# Patient Record
Sex: FEMALE | Race: BLACK OR AFRICAN AMERICAN | NOT HISPANIC OR LATINO | URBAN - METROPOLITAN AREA
[De-identification: names, ages, dates, MRNs, and addresses within clinical notes are randomized per-mention and may not be internally consistent; named-entity substitution may affect disease eponyms.]

---

## 2017-06-12 ENCOUNTER — EMERGENCY (EMERGENCY)
Facility: HOSPITAL | Age: 27
LOS: 1 days | Discharge: HOME | End: 2017-06-12
Attending: EMERGENCY MEDICINE | Admitting: EMERGENCY MEDICINE
Payer: COMMERCIAL

## 2017-06-12 VITALS
SYSTOLIC BLOOD PRESSURE: 118 MMHG | TEMPERATURE: 98 F | RESPIRATION RATE: 17 BRPM | OXYGEN SATURATION: 99 % | HEART RATE: 76 BPM | DIASTOLIC BLOOD PRESSURE: 60 MMHG

## 2017-06-12 PROCEDURE — 99284 EMERGENCY DEPT VISIT MOD MDM: CPT

## 2017-06-12 RX ORDER — IBUPROFEN 200 MG
600 TABLET ORAL ONCE
Qty: 0 | Refills: 0 | Status: COMPLETED | OUTPATIENT
Start: 2017-06-12 | End: 2017-06-12

## 2017-06-12 RX ADMIN — Medication 600 MILLIGRAM(S): at 18:09

## 2017-06-12 NOTE — ED PROVIDER NOTE - UPPER EXTREMITY EXAM, LEFT
Strong equal . Full active and passive ROM of upper extremities. Strong radial pulses b/l. Distal sensation intact.

## 2017-06-12 NOTE — ED PROVIDER NOTE - ATTENDING CONTRIBUTION TO CARE
I performed the initial face to face bedside interview with this patient regarding history of present illness, review of symptoms and past medical, social and family history.  I completed an independent physical examination.  I was the initial provider who evaluated this patient.  The history, review of symptoms and examination was documented by the scribe in my presence and I attest to the accuracy of the documentation.  I have signed out the follow up of any pending tests (i.e. labs, radiological studies) to the PA.  I have discussed the patient’s plan of care and disposition with the PA.  -ELIE Avendano, DO
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
## 2017-06-12 NOTE — ED PROVIDER NOTE - NS ED MD SCRIBE ATTENDING SCRIBE SECTIONS
VITAL SIGNS( Pullset)/HIV/REVIEW OF SYSTEMS/DISPOSITION/HISTORY OF PRESENT ILLNESS/PAST MEDICAL/SURGICAL/SOCIAL HISTORY/PHYSICAL EXAM
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
## 2017-06-12 NOTE — ED PROVIDER NOTE - PROGRESS NOTE DETAILS
MARY Dempsey: Pt signed out to me by paul PATEL. Plan to give pain meds and discharge. Pt ambulating and neurovascular intact. No bony tenderness. Pt stable for discharge and to follow up with pmd.

## 2024-03-21 ENCOUNTER — EMERGENCY (EMERGENCY)
Facility: HOSPITAL | Age: 34
LOS: 1 days | Discharge: HOME | End: 2024-03-21
Attending: EMERGENCY MEDICINE | Admitting: EMERGENCY MEDICINE
Payer: COMMERCIAL

## 2024-03-21 VITALS
SYSTOLIC BLOOD PRESSURE: 127 MMHG | TEMPERATURE: 98 F | OXYGEN SATURATION: 100 % | RESPIRATION RATE: 16 BRPM | HEART RATE: 86 BPM | DIASTOLIC BLOOD PRESSURE: 75 MMHG

## 2024-03-21 VITALS — HEIGHT: 68 IN

## 2024-03-21 LAB
ALBUMIN SERPL ELPH-MCNC: 4.2 G/DL — SIGNIFICANT CHANGE UP (ref 3.3–5)
ALP SERPL-CCNC: 67 U/L — SIGNIFICANT CHANGE UP (ref 40–120)
ALT FLD-CCNC: 21 U/L — SIGNIFICANT CHANGE UP (ref 4–33)
ANION GAP SERPL CALC-SCNC: 10 MMOL/L — SIGNIFICANT CHANGE UP (ref 7–14)
APPEARANCE UR: CLEAR — SIGNIFICANT CHANGE UP
APTT BLD: 28 SEC — SIGNIFICANT CHANGE UP (ref 24.5–35.6)
AST SERPL-CCNC: 24 U/L — SIGNIFICANT CHANGE UP (ref 4–32)
BASOPHILS # BLD AUTO: 0.05 K/UL — SIGNIFICANT CHANGE UP (ref 0–0.2)
BASOPHILS NFR BLD AUTO: 0.5 % — SIGNIFICANT CHANGE UP (ref 0–2)
BILIRUB SERPL-MCNC: 0.3 MG/DL — SIGNIFICANT CHANGE UP (ref 0.2–1.2)
BILIRUB UR-MCNC: NEGATIVE — SIGNIFICANT CHANGE UP
BLD GP AB SCN SERPL QL: NEGATIVE — SIGNIFICANT CHANGE UP
BUN SERPL-MCNC: 12 MG/DL — SIGNIFICANT CHANGE UP (ref 7–23)
CALCIUM SERPL-MCNC: 9.2 MG/DL — SIGNIFICANT CHANGE UP (ref 8.4–10.5)
CHLORIDE SERPL-SCNC: 106 MMOL/L — SIGNIFICANT CHANGE UP (ref 98–107)
CO2 SERPL-SCNC: 23 MMOL/L — SIGNIFICANT CHANGE UP (ref 22–31)
COLOR SPEC: YELLOW — SIGNIFICANT CHANGE UP
CREAT SERPL-MCNC: 0.8 MG/DL — SIGNIFICANT CHANGE UP (ref 0.5–1.3)
DIFF PNL FLD: NEGATIVE — SIGNIFICANT CHANGE UP
EGFR: 100 ML/MIN/1.73M2 — SIGNIFICANT CHANGE UP
EOSINOPHIL # BLD AUTO: 0.11 K/UL — SIGNIFICANT CHANGE UP (ref 0–0.5)
EOSINOPHIL NFR BLD AUTO: 1.1 % — SIGNIFICANT CHANGE UP (ref 0–6)
GLUCOSE SERPL-MCNC: 97 MG/DL — SIGNIFICANT CHANGE UP (ref 70–99)
GLUCOSE UR QL: NEGATIVE MG/DL — SIGNIFICANT CHANGE UP
HCG SERPL-ACNC: 768.2 MIU/ML — SIGNIFICANT CHANGE UP
HCT VFR BLD CALC: 29.2 % — LOW (ref 34.5–45)
HGB BLD-MCNC: 9.9 G/DL — LOW (ref 11.5–15.5)
IANC: 6.75 K/UL — SIGNIFICANT CHANGE UP (ref 1.8–7.4)
IMM GRANULOCYTES NFR BLD AUTO: 0.2 % — SIGNIFICANT CHANGE UP (ref 0–0.9)
INR BLD: 0.95 RATIO — SIGNIFICANT CHANGE UP (ref 0.85–1.18)
KETONES UR-MCNC: ABNORMAL MG/DL
LEUKOCYTE ESTERASE UR-ACNC: NEGATIVE — SIGNIFICANT CHANGE UP
LIDOCAIN IGE QN: 39 U/L — SIGNIFICANT CHANGE UP (ref 7–60)
LYMPHOCYTES # BLD AUTO: 2.78 K/UL — SIGNIFICANT CHANGE UP (ref 1–3.3)
LYMPHOCYTES # BLD AUTO: 26.6 % — SIGNIFICANT CHANGE UP (ref 13–44)
MCHC RBC-ENTMCNC: 27.7 PG — SIGNIFICANT CHANGE UP (ref 27–34)
MCHC RBC-ENTMCNC: 33.9 GM/DL — SIGNIFICANT CHANGE UP (ref 32–36)
MCV RBC AUTO: 81.6 FL — SIGNIFICANT CHANGE UP (ref 80–100)
MONOCYTES # BLD AUTO: 0.76 K/UL — SIGNIFICANT CHANGE UP (ref 0–0.9)
MONOCYTES NFR BLD AUTO: 7.3 % — SIGNIFICANT CHANGE UP (ref 2–14)
NEUTROPHILS # BLD AUTO: 6.75 K/UL — SIGNIFICANT CHANGE UP (ref 1.8–7.4)
NEUTROPHILS NFR BLD AUTO: 64.3 % — SIGNIFICANT CHANGE UP (ref 43–77)
NITRITE UR-MCNC: NEGATIVE — SIGNIFICANT CHANGE UP
NRBC # BLD: 0 /100 WBCS — SIGNIFICANT CHANGE UP (ref 0–0)
NRBC # FLD: 0 K/UL — SIGNIFICANT CHANGE UP (ref 0–0)
PH UR: 5.5 — SIGNIFICANT CHANGE UP (ref 5–8)
PLATELET # BLD AUTO: 230 K/UL — SIGNIFICANT CHANGE UP (ref 150–400)
POTASSIUM SERPL-MCNC: 3.8 MMOL/L — SIGNIFICANT CHANGE UP (ref 3.5–5.3)
POTASSIUM SERPL-SCNC: 3.8 MMOL/L — SIGNIFICANT CHANGE UP (ref 3.5–5.3)
PROT SERPL-MCNC: 7.2 G/DL — SIGNIFICANT CHANGE UP (ref 6–8.3)
PROT UR-MCNC: NEGATIVE MG/DL — SIGNIFICANT CHANGE UP
PROTHROM AB SERPL-ACNC: 10.7 SEC — SIGNIFICANT CHANGE UP (ref 9.5–13)
RBC # BLD: 3.58 M/UL — LOW (ref 3.8–5.2)
RBC # FLD: 13.7 % — SIGNIFICANT CHANGE UP (ref 10.3–14.5)
RH IG SCN BLD-IMP: POSITIVE — SIGNIFICANT CHANGE UP
SODIUM SERPL-SCNC: 139 MMOL/L — SIGNIFICANT CHANGE UP (ref 135–145)
SP GR SPEC: 1.02 — SIGNIFICANT CHANGE UP (ref 1–1.03)
UROBILINOGEN FLD QL: 1 MG/DL — SIGNIFICANT CHANGE UP (ref 0.2–1)
WBC # BLD: 10.47 K/UL — SIGNIFICANT CHANGE UP (ref 3.8–10.5)
WBC # FLD AUTO: 10.47 K/UL — SIGNIFICANT CHANGE UP (ref 3.8–10.5)

## 2024-03-21 PROCEDURE — 93970 EXTREMITY STUDY: CPT | Mod: 26

## 2024-03-21 PROCEDURE — 99285 EMERGENCY DEPT VISIT HI MDM: CPT

## 2024-03-21 PROCEDURE — 99053 MED SERV 10PM-8AM 24 HR FAC: CPT

## 2024-03-21 PROCEDURE — 99284 EMERGENCY DEPT VISIT MOD MDM: CPT | Mod: GC

## 2024-03-21 PROCEDURE — 76817 TRANSVAGINAL US OBSTETRIC: CPT | Mod: 26

## 2024-03-21 RX ORDER — METHOTREXATE 2.5 MG/1
100 TABLET ORAL ONCE
Refills: 0 | Status: COMPLETED | OUTPATIENT
Start: 2024-03-21 | End: 2024-03-21

## 2024-03-21 RX ADMIN — METHOTREXATE 100 MILLIGRAM(S): 2.5 TABLET ORAL at 10:50

## 2024-03-21 NOTE — ED PROVIDER NOTE - CLINICAL SUMMARY MEDICAL DECISION MAKING FREE TEXT BOX
33 yo F no PMHx presents with vaginal bleeding since  +HCG with no visualized IUP at 2w. Patient with a nonfocal exam, bilateral LE appear slightly asymmetric L>R. Will obtain CBC to evaluate Hgb, TVUS to evaluate IUP to work up threatened  vs ectopic.

## 2024-03-21 NOTE — ED PROVIDER NOTE - OBJECTIVE STATEMENT
35 yo  F presents with vaginal bleeding that has been ongoing since . Patient had a positive pregnancy test 1 week ago and had a follow up ultrasound without an IUP BHCG was 100s and then 200s the next day. Her bleeding had gotten worse while she was traveling and then eased in the last few days. She denies symptoms of weakness, lightheadedness. She got off a flight from [place] today and noticed L>R leg swelling, no sob.

## 2024-03-21 NOTE — ED PROVIDER NOTE - PHYSICAL EXAMINATION
GENERAL: no acute distress, non-toxic appearing  HEAD: normocephalic, atraumatic  HEENT: normal conjunctiva, oral mucosa moist, neck supple  CARDIAC: regular rate and rhythm, normal S1 and S2,  no appreciable murmurs  PULM: clear to ascultation bilaterally, no crackles, rales, rhonchi, or wheezing  GI: abdomen nondistended, soft, nontender, no guarding or rebound tenderness  NEURO: alert and oriented x 3, normal speech, moving all extremities   MSK: +slight asymmetry L>R swelling LE at ankle, no visible deformities,  calf tenderness/redness/swelling  SKIN: no visible rashes, dry, well-perfused  PSYCH: appropriate mood and affect

## 2024-03-21 NOTE — CONSULT NOTE ADULT - ASSESSMENT
33yo  @ 3w0d by LMP who presents with vaginal bleeding and for further management of previously described PUL. Pregnancy not desired and TVUS with imaging findings concerning for a left-sided ectopic pregnancy. hCG 768.2 and patient is overall hemodynamically stable w/ reassuring VS. Patient asymtomatic with benign abdomen.  Ectopic is <3.5 cm with no heart beat or contraindication for medical management Patient is candidate for treatment with Methotrexate therapy. Patient opts for methotrexate therapy.      Recommendations   - Dose for MTX is 100mg. D1 is today (3/21). D4 is 3/24 (patient informed she needs to follow-up in the ED or if her GYN can provide her a lab slip)  - Reviewed MTX patient information forms. Given the opportunity to ask questions and have concerns addressed. All questions were answered to the patient's apparent satisfaction   - Reviewed ruptured ectopic return precautions. Patient verbalized understanding    Koffi Daley  PGY-2, Obstetrics & Gynecology     seen and evaluated w/ Dr. DORIAN Parikh

## 2024-03-21 NOTE — ED ADULT TRIAGE NOTE - CHIEF COMPLAINT QUOTE
Patient c/o vaginal bleeding. Endorses intermittent spotting x few days. Sent by pmd to rule out ectopic. Denies abdominal pain. Pt. also c/o b/l leg pain, just got off plane from [place]. No PMH.

## 2024-03-21 NOTE — ED PROVIDER NOTE - PROGRESS NOTE DETAILS
Zvi Dubin, MD note: Called from radiology w/ US concerning for ectopic. HCG ~770. UA neg.   Pt told to be NPO. coags ordered. OBGYJANN paged. OBGYN consulted for L tubal lesion concerning for ectopic    Christel Lieberman MD, PGY3 33 yo F with L tubal lesion, OBGYN has seen patient, currently awaiting recommendations    Christel Lieberman MD, PGY3 Zvi Dubin, MD note: Called from radiology w/ US concerning for ectopic. HCG ~770. UA neg. Type +.   Pt told to be NPO. coags ordered. OBGYN paged. Mackenzie Montes De Oca DO PGY-3  Patient signed out to me pending OBGYN attending evaluation. OBGYN has evaluated patient, recommending methotrexate and follow up within 72 hours (Tre 3/24) for repeat beta check.     Discussed the plan for discharge home with the patient. Advised return precautions for any alarming or worsening symptoms, or any other concerns. Recommended that the patient call their primary care doctor and OBGYN today, inform them of their visit to the ER, and to obtain repeat evaluation in the ER in 3 days. Patient expresses understanding and agrees with the plan for follow up. At the time of discharge the patient remained stable, in no acute distress. Mackenzie Montes De Oca DO PGY-3  Patient signed out to me pending OBGYN attending evaluation. OBGYN has evaluated patient, recommending methotrexate and follow up within 72 hours (Tre 3/24) for repeat beta check.     Discussed the plan for discharge home with the patient. Advised return precautions for any alarming or worsening symptoms, or any other concerns. Recommended that the patient call their primary care doctor and OBGYN today, inform them of their visit to the ER, and to obtain repeat evaluation in the ER in 3 days. Patient expresses understanding and agrees with the plan for follow up. At the time of discharge the patient remained stable, in no acute distress.    Return precautions for ectopic pregnancy were provided prior to discharge.

## 2024-03-21 NOTE — ED PROVIDER NOTE - NSFOLLOWUPINSTRUCTIONS_ED_ALL_ED_FT
You were seen in the ER today for ectopic pregnancy diagnosed with ultrasound and labs. OBGYN evaluated you.  We treated youmedically with methotrexate.      Please return to the ER on Sunday March 24, 2024 in the morning for repeat bloodwork and evaluation.    Please follow up with your primary care doctor and OBGYN  within 1 - 3 days. Call and let them know you were seen in the ER today.     Please return to the Emergency Department if you experience any new or concerning symptoms, such as, but not limited to: worsening or severe pain, large amount of bleeding, passing out, shaking chills, vision changes, chest pain, difficulty breathing, unable to eat or drink, continuous vomiting or diarrhea, or are unable to move or feel part of your body.

## 2024-03-21 NOTE — ED ADULT NURSE NOTE - OBJECTIVE STATEMENT
sent by MD for r/o ectopic pregnancy. pt reports small amount of vaginal bleeding x 3 weeks, less than 1 pad saturated per day. pt denies pain, weakness, or dizziness. pt also returned from [place] trip via [place], landed at approx midnight. pt states b/l leg swelling noted since flight. denies n/t or sob. 20G iv placed to left AC, blood drawn and sent to lab. iv flushing well without complications, iv site WNL. safety measures maintained, side rails up x2. family at bedside. awaiting ultrasound

## 2024-03-21 NOTE — ED ADULT NURSE REASSESSMENT NOTE - NS ED NURSE REASSESS COMMENT FT1
additional blood work drawn and sent to lab. pt awaiting OBGYN consult. family at bedside
Received report from nightshift RN. Pt resting comfortably in bed, pending OBGYN Consult, updated on plan of care, verbalized understanding. Will continue to monitor.

## 2024-03-21 NOTE — ED PROVIDER NOTE - ATTENDING CONTRIBUTION TO CARE
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 +home UCG about a week ago.  Patient recently got off the plane from [place] and complains of left > right calf swelling.  No motor or sensory changes.    VS: afebrile, others  reassuring   Gen: Well appearing in NAD  Head: NC/AT  ENT: moist mucous membranes   Neck: trachea midline, FROM (painless) - neg Sherrilldzinski   Resp:  No distress  Ext: L calf slightly larger than R calf, neurovasc intact, ,gait normal  Neuro:  A&Ox3  Skin:  Warm and dry as visualized  Psych:  appropriate affect and mood    Initial ED MDM: First trimester vaginal bleeding - hemodynamically stable & well appearing (other DDx: most likely threatened , other , implantation bleeding vs less likely ectopic, torsion or molar pregnancy)  Plan: 1) LABS/UA/HCG/T&S.  2) Transvaginal US & b/l LE US for DVT even though, as explained, hypercoaguable risk really doesn't increase in pregnancy until the peripartum period.  3) reassess.  4) OBGYN consult if needed. 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 +home UCG about a week ago.  Patient recently got off the plane from [place] and complains of left > right calf swelling.  No motor or sensory changes.    VS: afebrile, others  reassuring   Gen: Well appearing in NAD  Head: NC/AT  ENT: moist mucous membranes   Neck: trachea midline, FROM (painless) - neg Brudzinski   Resp:  No distress  ABD: nontender  : (chap RN Fanto): no external lesions, no blood in vault, os closed.  Ext: L calf slightly larger than R calf, neurovasc intact, ,gait normal  Neuro:  A&Ox3  Skin:  Warm and dry as visualized  Psych:  appropriate affect and mood    Initial ED MDM: First trimester vaginal bleeding - hemodynamically stable & well appearing (other DDx: most likely threatened , other , implantation bleeding vs less likely ectopic, torsion or molar pregnancy)  Plan: 1) LABS/UA/HCG/T&S.  2) Transvaginal US & b/l LE US for DVT even though, as explained, hypercoaguable risk really doesn't increase in pregnancy until the peripartum period.  3) reassess.  4) OBGYN consult if needed.

## 2024-03-21 NOTE — ED PROVIDER NOTE - PATIENT PORTAL LINK FT
You can access the FollowMyHealth Patient Portal offered by [facility] by registering at the following website: [email or web address]. By joining [facility]’s FollowMyHealth portal, you will also be able to view your health information using other applications (apps) compatible with our system.

## 2024-03-21 NOTE — CONSULT NOTE ADULT - SUBJECTIVE AND OBJECTIVE BOX
CECILIO LOCKWOOD  34y  Female [id number]    HPI: 35yo  @ 3w0d (LMP 2024) who presents to the ED for persistent vaginal bleeding and described further evaluation of known PUL. Patient reports 14 days of vaginal bleeding and spotting, ~1.5 pads/day that has since stopped. Patient initially presented to her GYN on 3/11 where serum hCG was reportedly 168 and repeated the day after (reportedly in 200s). There was nothing visualized in the uterus and at this point, patient left for a vacation in [place] from 3/13-3/20. Patient had been reportedly counseled by her GYN that ectopic was in the differential prior to leaving. States she was able to get a sonogram on 3/16 in [place] at some point that did not show anything in the uterus. This is an unplanned and undesired pregnancy. Patient denies any fevers, chills, chest pain, shortness of breath, n/v, or any other complaints     Name of Ob/Gyn Physician: Dr. 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 Alert. No acute distress   Lungs: Unlabored breathing. No respiratory distress   Abd: Soft, non-tender, and non-distended   (w/ Chaperone RN Yvette Ahumada):  No bleeding on pad. External vulva and labia w/o lesions or skin changes seen.  Bimanual exam with no cervical motion tenderness, uterus mid-position and small, and adnexa non palpable b/l. Adnexa non-tender b/l. Cervix closed. Physiologic discharge in the vault   Ext: No calf tenderness  bilaterally    LABS:                              9.9    10.47 )-----------( 230      ( 21 Mar 2024 04:10 )             29.2     -    139  |  106  |  12  ----------------------------<  97  3.8   |  23  |  0.80    Ca    9.2      21 Mar 2024 04:10    TPro  7.2  /  Alb  4.2  /  TBili  0.3  /  DBili  x   /  AST  24  /  ALT  21  /  AlkPhos  67      I&O's Detail    PT/INR - ( 21 Mar 2024 06:00 )   PT: 10.7 sec;   INR: 0.95 ratio         PTT - ( 21 Mar 2024 06:00 )  PTT:28.0 sec  Urinalysis Basic - ( 21 Mar 2024 04:10 )    Color: Yellow / Appearance: Clear / S.025 / pH: x  Gluc: 97 mg/dL / Ketone: Trace mg/dL  / Bili: Negative / Urobili: 1.0 mg/dL   Blood: x / Protein: Negative mg/dL / Nitrite: Negative   Leuk Esterase: Negative / RBC: x / WBC x   Sq Epi: x / Non Sq Epi: x / Bacteria: x        RADIOLOGY & ADDITIONAL STUDIES:    < from: US Transvaginal, OB (24 @ 05:44) >    ACC: [id number] EXAM:  US OB TRANSVAGINAL   ORDERED BY: ZVI DUBIN     PROCEDURE DATE:  2024          INTERPRETATION:  CLINICAL INFORMATION: First trimester bleeding, beta hCG   760.2.    LMP: 2024    Estimated Gestational Age by LMP: 2024.    COMPARISON: None available.    Endovaginal pelvic sonogram only. Color and Spectral Doppler was   performed.    FINDINGS:  Uterus: Measuring 8.5 x 4.1 x 6.0 cm. No discrete uterine mass.  Endometrium: 5 mm. No intrauterine gestational sac isseen.    Right ovary: 2.8 cm x 2.1 cm x 2.3 cm. corpus luteum. Normal arterial and   venous waveforms.  Left ovary: 3.0 cm x 1.5 cm x 2.7 cm. Within normal limits. Normal   arterial and venous waveforms.    Left adnexa: A complex left extra ovarian lesion measures 1.6 x 0.8 cm.    Fluid: Small right adnexal simple fluid.    IMPRESSION:  A 1.6 cm complex left extraovarian lesion in the absence of a visible IUP   is highly suspicious for a left tubal ectopic pregnancy.    Dr. Santos discussed these findings with Dr. Lynn on 3/21/2024 5:53   AM with read back confirmation.    --- End of Report ---          NISHANT SANTOS MD; Resident Radiologist  This document has been electronically signed.  HOME BENAVIDES MD; Attending Radiologist  This document has been electronically signed. Mar 21 2024  5:56AM    < end of copied text >   CECILIO LOCKWOOD  34y  Female [id number]    HPI: 33yo  @ 3w0d (LMP 2024) who presents to the ED for persistent vaginal bleeding and described further evaluation of known PUL. Patient reports 14 days of vaginal bleeding and spotting, ~1.5 pads/day that has since stopped. Patient initially presented to her GYN on 3/11 where serum hCG was reportedly 168 and repeated the day after (reportedly in 200s). There was nothing visualized in the uterus and at this point, patient left for a vacation in [place] from 3/13-3/20. Patient had been reportedly counseled by her GYN that ectopic was in the differential prior to leaving. States she was able to get a sonogram on 3/16 in [place] at some point that did not show anything in the uterus. This is an unplanned and undesired pregnancy. Patient denies any fevers, chills, chest pain, shortness of breath, n/v, or any other complaints     Name of Ob/Gyn Physician: Dr. 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 Alert. No acute distress   Lungs: Unlabored breathing. No respiratory distress   Abd: Soft, non-tender, and non-distended   (w/ Chaperone RN Yvette Ahumada):  No bleeding on pad. External vulva and labia w/o lesions or skin changes seen.  Bimanual exam with no cervical motion tenderness, uterus mid-position and small, and adnexa non palpable b/l. Adnexa non-tender b/l. Cervix closed. Physiologic discharge in the vault   Ext: No calf tenderness  bilaterally    LABS:                              9.9    10.47 )-----------( 230      ( 21 Mar 2024 04:10 )             29.2         139  |  106  |  12  ----------------------------<  97  3.8   |  23  |  0.80    Ca    9.2      21 Mar 2024 04:10    TPro  7.2  /  Alb  4.2  /  TBili  0.3  /  DBili  x   /  AST  24  /  ALT  21  /  AlkPhos  67      I&O's Detail    PT/INR - ( 21 Mar 2024 06:00 )   PT: 10.7 sec;   INR: 0.95 ratio         PTT - ( 21 Mar 2024 06:00 )  PTT:28.0 sec  Urinalysis Basic - ( 21 Mar 2024 04:10 )    Color: Yellow / Appearance: Clear / S.025 / pH: x  Gluc: 97 mg/dL / Ketone: Trace mg/dL  / Bili: Negative / Urobili: 1.0 mg/dL   Blood: x / Protein: Negative mg/dL / Nitrite: Negative   Leuk Esterase: Negative / RBC: x / WBC x   Sq Epi: x / Non Sq Epi: x / Bacteria: x    HCG Quantitative, Serum: 768.2      RADIOLOGY & ADDITIONAL STUDIES:    < from: US Transvaginal, OB (24 @ 05:44) >    ACC: [id number] EXAM:  US OB TRANSVAGINAL   ORDERED BY: ZVI DUBIN     PROCEDURE DATE:  2024          INTERPRETATION:  CLINICAL INFORMATION: First trimester bleeding, beta hCG   760.2.    LMP: 2024    Estimated Gestational Age by LMP: 2024.    COMPARISON: None available.    Endovaginal pelvic sonogram only. Color and Spectral Doppler was   performed.    FINDINGS:  Uterus: Measuring 8.5 x 4.1 x 6.0 cm. No discrete uterine mass.  Endometrium: 5 mm. No intrauterine gestational sac isseen.    Right ovary: 2.8 cm x 2.1 cm x 2.3 cm. corpus luteum. Normal arterial and   venous waveforms.  Left ovary: 3.0 cm x 1.5 cm x 2.7 cm. Within normal limits. Normal   arterial and venous waveforms.    Left adnexa: A complex left extra ovarian lesion measures 1.6 x 0.8 cm.    Fluid: Small right adnexal simple fluid.    IMPRESSION:  A 1.6 cm complex left extraovarian lesion in the absence of a visible IUP   is highly suspicious for a left tubal ectopic pregnancy.    Dr. Santos discussed these findings with Dr. Lynn on 3/21/2024 5:53   AM with read back confirmation.    --- End of Report ---          NISHANT SANTOS MD; Resident Radiologist  This document has been electronically signed.  HOME BENAVIDES MD; Attending Radiologist  This document has been electronically signed. Mar 21 2024  5:56AM    < end of copied text >

## 2024-03-24 ENCOUNTER — EMERGENCY (EMERGENCY)
Facility: HOSPITAL | Age: 34
LOS: 1 days | Discharge: HOME | End: 2024-03-24
Attending: EMERGENCY MEDICINE
Payer: COMMERCIAL

## 2024-03-24 VITALS
HEIGHT: 68 IN | DIASTOLIC BLOOD PRESSURE: 53 MMHG | HEART RATE: 71 BPM | RESPIRATION RATE: 20 BRPM | WEIGHT: 181 LBS | OXYGEN SATURATION: 100 % | TEMPERATURE: 98 F | SYSTOLIC BLOOD PRESSURE: 100 MMHG

## 2024-03-24 VITALS
DIASTOLIC BLOOD PRESSURE: 71 MMHG | SYSTOLIC BLOOD PRESSURE: 119 MMHG | RESPIRATION RATE: 16 BRPM | OXYGEN SATURATION: 98 % | HEART RATE: 72 BPM | TEMPERATURE: 98 F

## 2024-03-24 LAB
ALBUMIN SERPL ELPH-MCNC: 4.4 G/DL — SIGNIFICANT CHANGE UP (ref 3.3–5)
ALP SERPL-CCNC: 70 U/L — SIGNIFICANT CHANGE UP (ref 40–120)
ALT FLD-CCNC: 46 U/L — HIGH (ref 10–45)
ANION GAP SERPL CALC-SCNC: 13 MMOL/L — SIGNIFICANT CHANGE UP (ref 5–17)
AST SERPL-CCNC: 34 U/L — SIGNIFICANT CHANGE UP (ref 10–40)
BASOPHILS # BLD AUTO: 0.03 K/UL — SIGNIFICANT CHANGE UP (ref 0–0.2)
BASOPHILS NFR BLD AUTO: 0.5 % — SIGNIFICANT CHANGE UP (ref 0–2)
BILIRUB SERPL-MCNC: 0.5 MG/DL — SIGNIFICANT CHANGE UP (ref 0.2–1.2)
BLD GP AB SCN SERPL QL: NEGATIVE — SIGNIFICANT CHANGE UP
BUN SERPL-MCNC: 9 MG/DL — SIGNIFICANT CHANGE UP (ref 7–23)
CALCIUM SERPL-MCNC: 9.5 MG/DL — SIGNIFICANT CHANGE UP (ref 8.4–10.5)
CHLORIDE SERPL-SCNC: 105 MMOL/L — SIGNIFICANT CHANGE UP (ref 96–108)
CO2 SERPL-SCNC: 22 MMOL/L — SIGNIFICANT CHANGE UP (ref 22–31)
CREAT SERPL-MCNC: 0.66 MG/DL — SIGNIFICANT CHANGE UP (ref 0.5–1.3)
EGFR: 119 ML/MIN/1.73M2 — SIGNIFICANT CHANGE UP
EOSINOPHIL # BLD AUTO: 0.07 K/UL — SIGNIFICANT CHANGE UP (ref 0–0.5)
EOSINOPHIL NFR BLD AUTO: 1.3 % — SIGNIFICANT CHANGE UP (ref 0–6)
GLUCOSE SERPL-MCNC: 94 MG/DL — SIGNIFICANT CHANGE UP (ref 70–99)
HCG SERPL-ACNC: 904.1 MIU/ML — HIGH
HCT VFR BLD CALC: 33 % — LOW (ref 34.5–45)
HGB BLD-MCNC: 10.9 G/DL — LOW (ref 11.5–15.5)
IMM GRANULOCYTES NFR BLD AUTO: 0.4 % — SIGNIFICANT CHANGE UP (ref 0–0.9)
LYMPHOCYTES # BLD AUTO: 1.72 K/UL — SIGNIFICANT CHANGE UP (ref 1–3.3)
LYMPHOCYTES # BLD AUTO: 31.2 % — SIGNIFICANT CHANGE UP (ref 13–44)
MCHC RBC-ENTMCNC: 27.3 PG — SIGNIFICANT CHANGE UP (ref 27–34)
MCHC RBC-ENTMCNC: 33 GM/DL — SIGNIFICANT CHANGE UP (ref 32–36)
MCV RBC AUTO: 82.7 FL — SIGNIFICANT CHANGE UP (ref 80–100)
MONOCYTES # BLD AUTO: 0.21 K/UL — SIGNIFICANT CHANGE UP (ref 0–0.9)
MONOCYTES NFR BLD AUTO: 3.8 % — SIGNIFICANT CHANGE UP (ref 2–14)
NEUTROPHILS # BLD AUTO: 3.46 K/UL — SIGNIFICANT CHANGE UP (ref 1.8–7.4)
NEUTROPHILS NFR BLD AUTO: 62.8 % — SIGNIFICANT CHANGE UP (ref 43–77)
NRBC # BLD: 0 /100 WBCS — SIGNIFICANT CHANGE UP (ref 0–0)
PLATELET # BLD AUTO: 249 K/UL — SIGNIFICANT CHANGE UP (ref 150–400)
POTASSIUM SERPL-MCNC: 3.9 MMOL/L — SIGNIFICANT CHANGE UP (ref 3.5–5.3)
POTASSIUM SERPL-SCNC: 3.9 MMOL/L — SIGNIFICANT CHANGE UP (ref 3.5–5.3)
PROT SERPL-MCNC: 7.4 G/DL — SIGNIFICANT CHANGE UP (ref 6–8.3)
RBC # BLD: 3.99 M/UL — SIGNIFICANT CHANGE UP (ref 3.8–5.2)
RBC # FLD: 13.7 % — SIGNIFICANT CHANGE UP (ref 10.3–14.5)
RH IG SCN BLD-IMP: POSITIVE — SIGNIFICANT CHANGE UP
SODIUM SERPL-SCNC: 140 MMOL/L — SIGNIFICANT CHANGE UP (ref 135–145)
WBC # BLD: 5.51 K/UL — SIGNIFICANT CHANGE UP (ref 3.8–10.5)
WBC # FLD AUTO: 5.51 K/UL — SIGNIFICANT CHANGE UP (ref 3.8–10.5)

## 2024-03-24 PROCEDURE — 85025 COMPLETE CBC W/AUTO DIFF WBC: CPT

## 2024-03-24 PROCEDURE — 76817 TRANSVAGINAL US OBSTETRIC: CPT | Mod: 26

## 2024-03-24 PROCEDURE — 80053 COMPREHEN METABOLIC PANEL: CPT

## 2024-03-24 PROCEDURE — 86901 BLOOD TYPING SEROLOGIC RH(D): CPT

## 2024-03-24 PROCEDURE — 84702 CHORIONIC GONADOTROPIN TEST: CPT

## 2024-03-24 PROCEDURE — 76817 TRANSVAGINAL US OBSTETRIC: CPT

## 2024-03-24 PROCEDURE — 99284 EMERGENCY DEPT VISIT MOD MDM: CPT

## 2024-03-24 PROCEDURE — 99284 EMERGENCY DEPT VISIT MOD MDM: CPT | Mod: 25

## 2024-03-24 PROCEDURE — 86900 BLOOD TYPING SEROLOGIC ABO: CPT

## 2024-03-24 PROCEDURE — 36415 COLL VENOUS BLD VENIPUNCTURE: CPT

## 2024-03-24 PROCEDURE — 86850 RBC ANTIBODY SCREEN: CPT

## 2024-03-24 NOTE — CONSULT NOTE ADULT - ASSESSMENT
A/P   34y    presenting to ED for day 4 beta-hCG follow-up after receiving methotrexate on 3/21. Reports continued vaginal bleeding, but no heavy bleeding. Denies significant abdominal pain. Beta-hCG today is 904.1. TVUS shows stable left ectopic pregnancy. Vitals stable.       #Left ectopic pregnancy  - Labs reviewed: H/H 10.9/33, WBC 5.51, T&S O+  - Vital signs stable  - Patient to follow up on 3/27 for Day 7 beta-hCG testing  - Ectopic precautions reviewed with patient. Discussed with patient importance of follow up for b-HCG on day 7 (3/27) for appropriate methotrexate management.  Patient expressed understanding.  All questions and concerns addressed to patient's apparent satisfaction.   - strict return precautions provided  - Patient to be added to GYN b-HCG list for closer follow up.    - Stable for d/c home from Gyn perspective, no acute intervention; primary management per ED team      D/w Dr. Matthew Hansen, PGY2

## 2024-03-24 NOTE — ED ADULT NURSE NOTE - IN THE PAST 12 MONTHS HAVE YOU USED DRUGS OTHER THAN THOSE REQUIRED FOR MEDICAL REASON?
Zeeshan Samuel RD  Outcome: Progressing No

## 2024-03-24 NOTE — ED ADULT NURSE NOTE - OBJECTIVE STATEMENT
33 y/o female, A&O x4,  @ 3w0d (LMP 2024) presents to the ED requesting HCG repeat. Pt endorses receiving MTX at [facility] 3/21 for left ectopic pregnancy. Pt states soaking 3-4 days daily, no clots or tissue. Pt affect is calm and appropriate, spontaneous unlabored breathing, abdomen soft nondistended, strong peripheral pulses. Pt denies chest pain, SOB/difficulty breathing, fever/chills, HA, abd pain, N/V/D, lightheadedness/dizziness, numbness/tingling. Safety and comfort measures maintained.

## 2024-03-24 NOTE — ED PROVIDER NOTE - PROGRESS NOTE DETAILS
Wan PGY3: Prior TVUS from 3/21 at [facility]  FINDINGS:  Uterus: Measuring 8.5 x 4.1 x 6.0 cm. No discrete uterine mass.  Endometrium: 5 mm. No intrauterine gestational sac is seen.  Right ovary: 2.8 cm x 2.1 cm x 2.3 cm. corpus luteum. Normal arterial and   venous waveforms.  Left ovary: 3.0 cm x 1.5 cm x 2.7 cm. Within normal limits. Normal   arterial and venous waveforms.  Left adnexa: A complex left extra ovarian lesion measures 1.6 x 0.8 cm.  Fluid: Small right adnexal simple fluid.    IMPRESSION:  A 1.6 cm complex left extraovarian lesion in the absence of a visible IUP   is highly suspicious for a left tubal ectopic pregnancy Received signout. Patient evaluated by OB, can f/u for day 7 hcg check. to be discharged.  -Jett Rosas PGY-2

## 2024-03-24 NOTE — ED ADULT NURSE NOTE - CHIEF COMPLAINT QUOTE
sent for repeat HCG. diagnosed with ectopic pregnancy on 3/21 and given methotrexate. +vaginal bleeding.

## 2024-03-24 NOTE — ED PROVIDER NOTE - CLINICAL SUMMARY MEDICAL DECISION MAKING FREE TEXT BOX
Wan PGY3: 33yo  @ 3w0d (LMP 2024) who presents to the ED for BHCG/repeat TVUS test after being givne MTX at [facility] 3/21 for follow up of L ectopic for repeat bhcg and tvus after getting US on 3/21 and MTX at [facility] (MRN [id number]). Otherwise well appearing w/ some vaginal bleeding but no pelvic pain, syncope or abd sxs. Wan PGY3: 33yo  @ 3w0d (LMP 2024) who presents to the ED for BHCG/repeat TVUS test after being givne MTX at [facility] 3/21 for follow up of L ectopic for repeat bhcg and tvus after getting US on 3/21 and MTX at [facility] (MRN [id number]). Otherwise well appearing w/ some vaginal bleeding but no pelvic pain, syncope or abd sxs.  RGUJRAL 33yo  sp TVUS with imaging findings concerning for a left-sided ectopic pregnancy with hCG 768.2 on 3/21 s/p methotrexate presents for follow up hcg. Pt reports mild vaginal bleeding and mild L pelvic pain. No f/c. No n/v, lightheadedness. Pt is blood type O positive.   On exam, Patient is awake,alert,oriented x 3. Patient is well appearing and in no acute distress. Patient's chest is clear to ausculation, +s1s2. Abdomen is soft nd/nt +BS. Extremity with no swelling or calf tenderness.   Obtain labs, US to eval, pt declines any meds. Monitor and re eval.

## 2024-03-24 NOTE — ED PROVIDER NOTE - OBJECTIVE STATEMENT
35yo  @ 3w0d (LMP 2024) who presents to the ED for BHCG/repeat TVUS test after being givne MTX at [facility] 3/21 for suspected L ectopic. Last wnl menstrual period , then had what seemed to be her period  but continued to have spotting and bleeding so went to GYN on 3/11 who informed her she was preg. At ED visit 3/21 - BHCG 768, O+, Hbg 9.9, TVUS (see below progress note). No significant abd pain but having inc bleeding since given MTX , 3-4 normal thickness pads/day. no lightheaded/sob, +fatigue. No blood thinners.     Name of Ob/Gyn Physician: Dr. Suki Davies

## 2024-03-24 NOTE — ED PROVIDER NOTE - PATIENT PORTAL LINK FT
You can access the FollowMyHealth Patient Portal offered by [facility] by registering at the following website: [email or web address]. By joining [employer]’s FollowMyHealth portal, you will also be able to view your health information using other applications (apps) compatible with our system.

## 2024-03-24 NOTE — ED ADULT NURSE NOTE - NSFALLUNIVINTERV_ED_ALL_ED
Bed/Stretcher in lowest position, wheels locked, appropriate side rails in place/Call bell, personal items and telephone in reach/Instruct patient to call for assistance before getting out of bed/chair/stretcher/Non-slip footwear applied when patient is off stretcher/[place] to call system/Physically safe environment - no spills, clutter or unnecessary equipment/Purposeful proactive rounding/Room/bathroom lighting operational, light cord in reach

## 2024-03-24 NOTE — ED ADULT TRIAGE NOTE - CHIEF COMPLAINT QUOTE
sent for repeat HCG. diagnosed with ectopic pregnancy on 3/21. +vaginal bleeding. sent for repeat HCG. diagnosed with ectopic pregnancy on 3/21 and given methotrexate. +vaginal bleeding.

## 2024-03-24 NOTE — ED PROVIDER NOTE - NSFOLLOWUPINSTRUCTIONS_ED_ALL_ED_FT
Your lab and imaging results from today's visit are attached.    Please follow up on 3/27 for day 7 hcg check.    Return to the ED for new or worsening symptoms.

## 2024-03-24 NOTE — CHART NOTE - NSCHARTNOTEFT_GEN_A_CORE
Called patient to remind her to come to the ED to get day 4 MTX HCG drawn. Patient did not . Voicemail left with reminder to come in today.    Tiffanie Young, PGY2

## 2024-03-24 NOTE — CONSULT NOTE ADULT - SUBJECTIVE AND OBJECTIVE BOX
GYN Consult Note    34y  @ 3w3d by LMP 24 who presents to the ED for methotrexate f/u. Patient was seen at [facility] on 3/21 for persistent vaginal bleeding and for PUL workup. She had beta-hCG testing with private OBGYN.   TVUS on 3/21 showed a 1.6 cm complex left extraovarian lesion highly suspicious for a left tubal ectopic pregnancy. Was administered methotrexate on 3/21, with beta-hCG 768.2 at that time.     Today, she reports continued mild-moderate bleeding. Denies saturating pads in <1 hour, severe abdominal pain, vomiting, or any lightheadedness.       Name of Ob/Gyn Physician: Dr. 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  - Primarily staying in [place] right now but primary residence is [place]           Vital Signs Last 24 Hrs  T(C): 36.7 (24 Mar 2024 18:42), Max: 36.7 (24 Mar 2024 18:19)  T(F): 98 (24 Mar 2024 18:42), Max: 98 (24 Mar 2024 18:19)  HR: 85 (24 Mar 2024 18:42) (71 - 85)  BP: 105/63 (24 Mar 2024 18:42) (100/53 - 105/63)  BP(mean): 77 (24 Mar 2024 18:42) (77 - 77)  RR: 16 (24 Mar 2024 18:42) (16 - 20)  SpO2: 100% (24 Mar 2024 18:42) (100% - 100%)    Parameters below as of 24 Mar 2024 18:42  Patient On (Oxygen Delivery Method): room air        PHYSICAL EXAM:  Gen: NAD, alert and oriented x 3  Cardiovascular: regular   Respiratory: breathing comfortably on RA  Abd: soft, non tender, non-distended, no rebound tenderness  Extremities: NTBL  Skin: warm and well perfused        LABS:                        10.9   5.51  )-----------( 249      ( 24 Mar 2024 18:57 )             33.0     03-24    140  |  105  |  9   ----------------------------<  94  3.9   |  22  |  0.66    Ca    9.5      24 Mar 2024 18:57    TPro  7.4  /  Alb  4.4  /  TBili  0.5  /  DBili  x   /  AST  34  /  ALT  46<H>  /  AlkPhos  70        Urinalysis Basic - ( 24 Mar 2024 18:57 )    Color: x / Appearance: x / SG: x / pH: x  Gluc: 94 mg/dL / Ketone: x  / Bili: x / Urobili: x   Blood: x / Protein: x / Nitrite: x   Leuk Esterase: x / RBC: x / WBC x   Sq Epi: x / Non Sq Epi: x / Bacteria: x        RADIOLOGY & ADDITIONAL STUDIES:  < from: US Transvaginal, OB (24 @ 20:44) >  FINDINGS:  Uterus: 7.3 x 3.3 x 5.9 cm. No intrauterine pregnancy visualized.   Endometrium measures 2 mm. Trace fluid in the endometrial cavity.    Right ovary/adnexa: 3.8 cm x 1.5 cm x 2.8 cm. Corpus luteum.  Left ovary/adnexa: 3.1 cm x 1.8 cm x 1.5 cm. Round soft tissue   echogenicity extraovarian mass measuring 1.6 x 1.1 x 1.1 cm with central   anechoic region, similar in appearance as compared with 3/21/2024. No   evidence of fetal pole within this area.    Fluid: Trace fluid in the left adnexa.    IMPRESSION:  Stable appearance to left adnexal ectopic. No significant complex fluid.      --- End of Report ---    < end of copied text >

## 2024-03-24 NOTE — ED PROVIDER NOTE - PHYSICAL EXAMINATION
CONSTITUTIONAL: NAD  SKIN: Warm dry  HEAD: NCAT  EYES: NL inspection; no conjunctival pallor   CARD: RRR  RESP: CTAB  ABD: S/NT no R/G; non-tender, no rebound/guarding  EXT: no LE edema  NEURO: Grossly unremarkable  PSYCH: Cooperative, appropriate.

## 2024-03-26 NOTE — CHART NOTE - NSCHARTNOTEFT_GEN_A_CORE
Called and left message to remind patient to have day 7 bHCG drawn tomorrow 3/27 in ED.     MARY Bess

## 2024-03-27 ENCOUNTER — EMERGENCY (EMERGENCY)
Facility: HOSPITAL | Age: 34
LOS: 1 days | Discharge: HOME | End: 2024-03-27
Attending: STUDENT IN AN ORGANIZED HEALTH CARE EDUCATION/TRAINING PROGRAM
Payer: COMMERCIAL

## 2024-03-27 VITALS
OXYGEN SATURATION: 100 % | DIASTOLIC BLOOD PRESSURE: 67 MMHG | RESPIRATION RATE: 17 BRPM | SYSTOLIC BLOOD PRESSURE: 100 MMHG | TEMPERATURE: 99 F | HEART RATE: 65 BPM

## 2024-03-27 VITALS
HEART RATE: 74 BPM | SYSTOLIC BLOOD PRESSURE: 95 MMHG | WEIGHT: 179.9 LBS | TEMPERATURE: 98 F | RESPIRATION RATE: 16 BRPM | HEIGHT: 68 IN | DIASTOLIC BLOOD PRESSURE: 55 MMHG | OXYGEN SATURATION: 98 %

## 2024-03-27 PROBLEM — Z87.59 PERSONAL HISTORY OF OTHER COMPLICATIONS OF PREGNANCY, CHILDBIRTH AND THE PUERPERIUM: Chronic | Status: ACTIVE | Noted: 2024-03-24

## 2024-03-27 LAB
ALBUMIN SERPL ELPH-MCNC: 4 G/DL — SIGNIFICANT CHANGE UP (ref 3.3–5)
ALP SERPL-CCNC: 72 U/L — SIGNIFICANT CHANGE UP (ref 40–120)
ALT FLD-CCNC: 27 U/L — SIGNIFICANT CHANGE UP (ref 10–45)
ANION GAP SERPL CALC-SCNC: 10 MMOL/L — SIGNIFICANT CHANGE UP (ref 5–17)
AST SERPL-CCNC: 17 U/L — SIGNIFICANT CHANGE UP (ref 10–40)
BASOPHILS # BLD AUTO: 0.03 K/UL — SIGNIFICANT CHANGE UP (ref 0–0.2)
BASOPHILS NFR BLD AUTO: 0.6 % — SIGNIFICANT CHANGE UP (ref 0–2)
BILIRUB SERPL-MCNC: 0.2 MG/DL — SIGNIFICANT CHANGE UP (ref 0.2–1.2)
BUN SERPL-MCNC: 11 MG/DL — SIGNIFICANT CHANGE UP (ref 7–23)
CALCIUM SERPL-MCNC: 9 MG/DL — SIGNIFICANT CHANGE UP (ref 8.4–10.5)
CHLORIDE SERPL-SCNC: 106 MMOL/L — SIGNIFICANT CHANGE UP (ref 96–108)
CO2 SERPL-SCNC: 24 MMOL/L — SIGNIFICANT CHANGE UP (ref 22–31)
CREAT SERPL-MCNC: 0.68 MG/DL — SIGNIFICANT CHANGE UP (ref 0.5–1.3)
EGFR: 119 ML/MIN/1.73M2 — SIGNIFICANT CHANGE UP
EOSINOPHIL # BLD AUTO: 0.1 K/UL — SIGNIFICANT CHANGE UP (ref 0–0.5)
EOSINOPHIL NFR BLD AUTO: 1.9 % — SIGNIFICANT CHANGE UP (ref 0–6)
GLUCOSE SERPL-MCNC: 86 MG/DL — SIGNIFICANT CHANGE UP (ref 70–99)
HCG SERPL-ACNC: 222.5 MIU/ML — HIGH
HCT VFR BLD CALC: 29.7 % — LOW (ref 34.5–45)
HGB BLD-MCNC: 9.8 G/DL — LOW (ref 11.5–15.5)
IMM GRANULOCYTES NFR BLD AUTO: 0.2 % — SIGNIFICANT CHANGE UP (ref 0–0.9)
LYMPHOCYTES # BLD AUTO: 2.52 K/UL — SIGNIFICANT CHANGE UP (ref 1–3.3)
LYMPHOCYTES # BLD AUTO: 48.8 % — HIGH (ref 13–44)
MCHC RBC-ENTMCNC: 27.5 PG — SIGNIFICANT CHANGE UP (ref 27–34)
MCHC RBC-ENTMCNC: 33 GM/DL — SIGNIFICANT CHANGE UP (ref 32–36)
MCV RBC AUTO: 83.2 FL — SIGNIFICANT CHANGE UP (ref 80–100)
MONOCYTES # BLD AUTO: 0.38 K/UL — SIGNIFICANT CHANGE UP (ref 0–0.9)
MONOCYTES NFR BLD AUTO: 7.4 % — SIGNIFICANT CHANGE UP (ref 2–14)
NEUTROPHILS # BLD AUTO: 2.12 K/UL — SIGNIFICANT CHANGE UP (ref 1.8–7.4)
NEUTROPHILS NFR BLD AUTO: 41.1 % — LOW (ref 43–77)
NRBC # BLD: 0 /100 WBCS — SIGNIFICANT CHANGE UP (ref 0–0)
PLATELET # BLD AUTO: 234 K/UL — SIGNIFICANT CHANGE UP (ref 150–400)
POTASSIUM SERPL-MCNC: 4.2 MMOL/L — SIGNIFICANT CHANGE UP (ref 3.5–5.3)
POTASSIUM SERPL-SCNC: 4.2 MMOL/L — SIGNIFICANT CHANGE UP (ref 3.5–5.3)
PROT SERPL-MCNC: 6.8 G/DL — SIGNIFICANT CHANGE UP (ref 6–8.3)
RBC # BLD: 3.57 M/UL — LOW (ref 3.8–5.2)
RBC # FLD: 13.6 % — SIGNIFICANT CHANGE UP (ref 10.3–14.5)
SODIUM SERPL-SCNC: 140 MMOL/L — SIGNIFICANT CHANGE UP (ref 135–145)
WBC # BLD: 5.16 K/UL — SIGNIFICANT CHANGE UP (ref 3.8–10.5)
WBC # FLD AUTO: 5.16 K/UL — SIGNIFICANT CHANGE UP (ref 3.8–10.5)

## 2024-03-27 PROCEDURE — 99283 EMERGENCY DEPT VISIT LOW MDM: CPT

## 2024-03-27 PROCEDURE — 84702 CHORIONIC GONADOTROPIN TEST: CPT

## 2024-03-27 PROCEDURE — 36415 COLL VENOUS BLD VENIPUNCTURE: CPT

## 2024-03-27 PROCEDURE — 99285 EMERGENCY DEPT VISIT HI MDM: CPT | Mod: 25

## 2024-03-27 PROCEDURE — 93975 VASCULAR STUDY: CPT

## 2024-03-27 PROCEDURE — 80053 COMPREHEN METABOLIC PANEL: CPT

## 2024-03-27 PROCEDURE — 76830 TRANSVAGINAL US NON-OB: CPT

## 2024-03-27 PROCEDURE — 85025 COMPLETE CBC W/AUTO DIFF WBC: CPT

## 2024-03-27 PROCEDURE — 93975 VASCULAR STUDY: CPT | Mod: 26

## 2024-03-27 PROCEDURE — 76830 TRANSVAGINAL US NON-OB: CPT | Mod: 26

## 2024-03-27 PROCEDURE — 99285 EMERGENCY DEPT VISIT HI MDM: CPT

## 2024-03-27 NOTE — ED PROVIDER NOTE - CLINICAL SUMMARY MEDICAL DECISION MAKING FREE TEXT BOX
34y    presenting to ED for day 7 beta-hCG follow-up after receiving methotrexate on 3/21.  Patient indicates that the vaginal bleeding has slowed to nearly a spotting level with no large clots passing.  She does report that she feels worsening left-sided abdominal pain over the last few days.  She indicates that it is worse than when she was seen on 3/24.  Pain waxes and wanes responds to Tylenol.  She last took 1000 mg of Tylenol 3 hours ago.  Patient denies any chest pain, shortness of breath or syncope.  will get labs, cbc/cmp/bhcg and consult obgyn.

## 2024-03-27 NOTE — ED PROVIDER NOTE - PATIENT PORTAL LINK FT
You can access the FollowMyHealth Patient Portal offered by [facility] by registering at the following website: [email or web address]. By joining [employer]’s FollowMyHealth portal, you will also be able to view your health information using other applications (apps) compatible with our system.

## 2024-03-27 NOTE — ED PROVIDER NOTE - OBJECTIVE STATEMENT
34y    presenting to ED for day 7 beta-hCG follow-up after receiving methotrexate on 3/21. 34y    presenting to ED for day 7 beta-hCG follow-up after receiving methotrexate on 3/21.  Patient indicates that the vaginal bleeding has slowed to nearly a spotting level with no large clots passing.  She does report that she feels worsening left-sided abdominal pain over the last few days.  She indicates that it is worse than when she was seen on 3/24.  Pain waxes and wanes responds to Tylenol.  She last took 1000 mg of Tylenol 3 hours ago.  Patient denies any chest pain, shortness of breath or syncope.

## 2024-03-27 NOTE — CONSULT NOTE ADULT - SUBJECTIVE AND OBJECTIVE BOX
CECILIO LOCKWOOD  34y  Female [id number]    HPI:  34y  @ 3w3d by LMP 24 who presents to the ED for methotrexate f/u. Patient was seen at [facility] on 3/21 for persistent vaginal bleeding and for PUL workup. She had beta-hCG testing with private OBGYN.   TVUS on 3/21 showed a 1.6 cm complex left extraovarian lesion highly suspicious for a left tubal ectopic pregnancy. Was administered methotrexate on 3/21, with beta-hCG 768.2->904(3/24)-Day4->225(3/27)Day7 at that time.     Today, she reports continued mild-moderate bleeding. Denies saturating pads in <1 hour, severe abdominal pain, vomiting, or any lightheadedness.       Name of Ob/Gyn Physician: Dr. 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  - Primarily staying in [place] right now but primary residence is [place]     Vital Signs Last 24 Hrs  T(C): 36.8 (27 Mar 2024 17:05), Max: 36.8 (27 Mar 2024 17:05)  T(F): 98.2 (27 Mar 2024 17:05), Max: 98.2 (27 Mar 2024 17:05)  HR: 64 (27 Mar 2024 17:05) (64 - 74)  BP: 101/63 (27 Mar 2024 17:05) (95/55 - 101/63)  BP(mean): --  RR: 18 (27 Mar 2024 17:05) (16 - 18)  SpO2: 100% (27 Mar 2024 17:05) (98% - 100%)    Parameters below as of 27 Mar 2024 17:05  Patient On (Oxygen Delivery Method): room air        Physical Exam:   General: sitting comfortably in bed, NAD   HEENT: neck supple, full ROM  CV: RRR  Lungs: CTA b/l, good air flow b/l   Back: No CVA tenderness  Abd: Soft, non-tender, non-distended.  Bowel sounds present.    :  No bleeding on pad.    External labia wnl.  Bimanual exam with no cervical motion tenderness, uterus wnl, adnexa non palpable b/l.  Cervix closed vs. Cervix dilated  Speculum Exam: No active bleeding from os.  Physiologic discharge.    Ext: non-tender b/l, no edema     LABS:                              9.8    5.16  )-----------( 234      ( 27 Mar 2024 15:44 )             29.7         140  |  106  |  11  ----------------------------<  86  4.2   |  24  |  0.68    Ca    9.0      27 Mar 2024 15:44    TPro  6.8  /  Alb  4.0  /  TBili  0.2  /  DBili  x   /  AST  17  /  ALT    /  AlkPhos  72      I&O's Detail

## 2024-03-27 NOTE — CONSULT NOTE ADULT - ATTENDING COMMENTS
OB attg note    Agree with above. 33yo P0 at 3+3 with presumed ectopic s/p MTX now here for day 7 labs, noted to have appropriate downtrend. Sono with slight increase in size of ectopic however pt asymptomatic with downtrending HCG so OK to follow weekly for HCGs. Rh pos.    Janelle PATEL

## 2024-03-27 NOTE — ED ADULT NURSE NOTE - CHIEF COMPLAINT QUOTE
had an ectopic pregnancy and received methotrexate last March 21 at [facility], came here for repeat HCG level.

## 2024-03-27 NOTE — ED PROVIDER NOTE - NSFOLLOWUPINSTRUCTIONS_ED_ALL_ED_FT
Follow up with ob/gyn in 1 week for repeat bloodwork, if unable you can return to the ED  Continue your current medication regimen  Return to the Emergency Room if you experience new or worsening symptoms    Ectopic Pregnancy    An ectopic pregnancy is the term for a pregnancy that is not in the correct location. This can become a life-threatening emergency if the ectopic pregnancy causes heavy internal or vaginal bleeding. An ectopic pregnancy cannot continue to term and must be managed by your obstetrician. This can cause issues with fertility and future pregnancies.    SEEK IMMEDIATE MEDICAL CARE IF YOU HAVE ANY OF THE FOLLOWING SYMPTOMS: heavy vaginal bleeding > 2 pads per hour for 2 consecutive hourse, severe low back or abdominal cramps, fever/chills, lightheadedness/dizziness, or fainting. Follow up with ob/gyn in 1 week for repeat bloodwork, if unable you can return to the ED  Continue your current medication regimen  Return to the Emergency Room if you experience new or worsening symptoms    Ectopic Pregnancy    An ectopic pregnancy is the term for a pregnancy that is not in the correct location. This can become a life-threatening emergency if the ectopic pregnancy causes heavy internal or vaginal bleeding. An ectopic pregnancy cannot continue to term and must be managed by your obstetrician. This can cause issues with fertility and future pregnancies.    SEEK IMMEDIATE MEDICAL CARE IF YOU HAVE ANY OF THE FOLLOWING SYMPTOMS: heavy vaginal bleeding > 2 pads per hour for 2 consecutive hours, severe low back or abdominal cramps, fever/chills, lightheadedness/dizziness, or fainting.

## 2024-03-27 NOTE — ED PROVIDER NOTE - NSICDXPASTMEDICALHX_GEN_ALL_CORE_FT
PAST MEDICAL HISTORY:  History of ectopic pregnancy L 2024    No pertinent past medical history

## 2024-03-27 NOTE — ED ADULT NURSE NOTE - OBJECTIVE STATEMENT
35 y/o F A&Ox3 denies PMH/PSH presents to the ED from home c/o HCG check. Pt reports receiving methotrexate for ectopic pregnancy on 3/21. Pt told to return to ED today for repeat blood work. Pt reports decreasing vaginal bleeding and abdominal cramping since the injection. Upon ED arrival pt is well appearing. Breathing is even and unlabored. skin is warm, dry & in tact. Pt denies CP, SOB, dizziness, N/V/D, numbness, tingling. IV access obtained. Comfort & safety provided.

## 2024-03-27 NOTE — ED ADULT NURSE NOTE - NSFALLUNIVINTERV_ED_ALL_ED
Bed/Stretcher in lowest position, wheels locked, appropriate side rails in place/Call bell, personal items and telephone in reach/Instruct patient to call for assistance before getting out of bed/chair/stretcher/Non-slip footwear applied when patient is off stretcher/[place] to call system/Physically safe environment - no spills, clutter or unnecessary equipment/Purposeful proactive rounding/Room/bathroom lighting operational, light cord in reach

## 2024-03-27 NOTE — ED ADULT NURSE NOTE - NSICDXPASTMEDICALHX_GEN_ALL_CORE_FT
Previous INR: 2.10     Previous dose:   6mg (5mg +1mg tablets) on mon and Friday and 5mg all others               Current INR: 2.00     Recommendation: continue 6mg (5mg +1mg tablets) on mon and Friday and 5mg all others    Next INR: 1 week     [facility]  5/13/2021  12:18 PM
PAST MEDICAL HISTORY:  History of ectopic pregnancy L 2024    No pertinent past medical history

## 2024-03-27 NOTE — CONSULT NOTE ADULT - ASSESSMENT
[id number] presenting to ED for day 7 beta-hCG follow-up after receiving methotrexate on 3/21. Reports decreased vaginal bleeding, minimal pelvic pain and no fevers Denies significant abdominal pain. Beta-hCG today is 225.    #Left ectopic pregnancy  - Labs reviewed: H/H 9.8/29.7 (stable from prior H/H) 3 days ago WBC 5.16, T&S O+  - bHC.1 (3/24)Day4->222 (3/27Day7) greater than 15% drop appropriate for drop per ACOG guidelines for MTX follow up.   - Vital signs stable, denies symptoms of anemia  - Patient to follow up in one week for repeat bHCG draw.  - Stable for d/c home from Gyn perspective, no acute intervention; primary management per ED team    Antionette PGY2  dw Dr. Moreno   [id number] presenting to ED for day 7 beta-hCG follow-up after receiving methotrexate on 3/21. Reports decreased vaginal bleeding, minimal pelvic pain and no fevers Denies significant abdominal pain. Beta-hCG today is 225.    #Left ectopic pregnancy  - Labs reviewed: H/H 9.8/29.7 (stable from prior H/H) 3 days ago WBC 5.16, T&S O+  - bHC.1 (3/24)Day4->222 (3/27Day7) greater than 15% drop appropriate for drop per ACOG guidelines for MTX follow up.   - Vital signs stable, denies symptoms of anemia  - Patient to follow up in one week for repeat bHCG draw.  - Stable for d/c home from Gyn perspective, no acute intervention; primary management per ED team    Antionette PGY2  ratna Moreno        ADDENDUM:  Patient received TVUS in the ED, although not indicated. Imaging reveals L ectopic pregnancy that is slightly increased in size. However, patient with appropriate drop in beta-hCG from Day 4 and Day 7.   - Patient to have follow-up as above  - cleared for discharge per GYN    Tequila Hansen, PGY2

## 2024-03-27 NOTE — ED PROVIDER NOTE - ATTENDING APP SHARED VISIT CONTRIBUTION OF CARE
LMP , presenst to the ER w/ known ectopic, pt reports that she follows w/ outpt gyn Dr. Stephen iglesias in [facility] here w/ mild pelvic discomfort reports on 3/20 was dx w/ ectopic on 3/21 took methotrexate pt w/ no cp, no sob, no dizziness, no nausea no vomiting.   On exam, pt is awake and alert oriented x3, w/ soft abd tenderness in the LLQ tenderness, plan for labs imaging and reassessment trending hcg level and likely gyn consult

## 2024-03-27 NOTE — ED PROVIDER NOTE - PROGRESS NOTE DETAILS
Skye Torrez PGY1: patient endorsed to me at signout. pending TVUSr Received a phone call from OB/GYN resident the patient does not need an ultrasound however patient is currently receiving ultrasound at this time.  Patient okay to follow-up in 1 week and if she develops worsening pelvic pain, lightheadedness dizziness severe vaginal bleeding should return to the hospital for repeat evaluation immediately. Skye Torrez PGY1: patient back from u/s. I assessed her. no acute distress at this time. resting comfortably on stretcher Skye Torrez PGY1: US results back. Consulted OB about results. Per OB, no intervention required at this time, she is clear to follow up outpatient.

## 2024-05-03 NOTE — CHART NOTE - NSCHARTNOTEFT_GEN_A_CORE
[facility]  An[place]ette PGY2
Two certified letters sent to patient after multiple attempts to reach by phone. Delivery failed in both instances. Second letter failed delivery 5/2. Given numerous attempts to reach patient without success, patient to be taken off of [facility] follow up list.     RILEY Holman PGY3

## 2025-07-19 NOTE — ED ADULT NURSE NOTE - NSSEPSISSUSPECTED_ED_A_ED
4 Eyes Admission Assessment     I agree as the admission nurse that 2 RN's have performed a thorough Head to Toe Skin Assessment on the patient. ALL assessment sites listed below have been assessed on admission.       Areas assessed by both nurses: Greyson  [x]   Head, Face, and Ears   [x]   Shoulders, Back, and Chest  [x]   Arms, Elbows, and Hands   [x]   Coccyx, Sacrum, and Ischum  [x]   Legs, Feet, and Heels        Does the Patient have Skin Breakdown?  No         Lambert Prevention initiated:  Yes   Wound Care Orders initiated:  NA      [facility] nurse consulted for Pressure Injury (Stage 3,4, Unstageable, DTI, NWPT, and Complex wounds):  No      Nurse 1 eSignature: Electronically signed by Greyson Marte RN on 7/19/25 at 7:38 PM EDT    **SHARE this note so that the co-signing nurse is able to place an eSignature**    Nurse 2 eSignature: {Esignature:[id number]}              No